# Patient Record
Sex: MALE
[De-identification: names, ages, dates, MRNs, and addresses within clinical notes are randomized per-mention and may not be internally consistent; named-entity substitution may affect disease eponyms.]

---

## 2020-12-29 ENCOUNTER — HOSPITAL ENCOUNTER (INPATIENT)
Dept: HOSPITAL 19 - COL.ER | Age: 40
LOS: 15 days | Discharge: HOME | DRG: 177 | End: 2021-01-13
Attending: STUDENT IN AN ORGANIZED HEALTH CARE EDUCATION/TRAINING PROGRAM | Admitting: STUDENT IN AN ORGANIZED HEALTH CARE EDUCATION/TRAINING PROGRAM
Payer: SELF-PAY

## 2020-12-29 VITALS — HEIGHT: 72.01 IN | WEIGHT: 230.16 LBS | BODY MASS INDEX: 31.17 KG/M2

## 2020-12-29 VITALS — TEMPERATURE: 98.8 F | DIASTOLIC BLOOD PRESSURE: 80 MMHG | HEART RATE: 107 BPM | SYSTOLIC BLOOD PRESSURE: 130 MMHG

## 2020-12-29 VITALS — HEART RATE: 112 BPM | DIASTOLIC BLOOD PRESSURE: 88 MMHG | TEMPERATURE: 100.1 F | SYSTOLIC BLOOD PRESSURE: 142 MMHG

## 2020-12-29 DIAGNOSIS — J12.82: ICD-10-CM

## 2020-12-29 DIAGNOSIS — R74.01: ICD-10-CM

## 2020-12-29 DIAGNOSIS — Z85.71: ICD-10-CM

## 2020-12-29 DIAGNOSIS — U07.1: Primary | ICD-10-CM

## 2020-12-29 DIAGNOSIS — J96.01: ICD-10-CM

## 2020-12-29 LAB
ALBUMIN SERPL-MCNC: 3.8 GM/DL (ref 3.5–5)
ALP SERPL-CCNC: 86 U/L (ref 50–136)
ALT SERPL-CCNC: 240 U/L (ref 4–49)
ANION GAP SERPL CALC-SCNC: 9 MMOL/L (ref 7–16)
AST SERPL-CCNC: 268 U/L (ref 15–37)
BASOPHILS # BLD: 0 10*3/UL (ref 0–0.2)
BASOPHILS NFR BLD AUTO: 0.2 % (ref 0–2)
BILIRUB SERPL-MCNC: 0.5 MG/DL (ref 0–1)
BUN SERPL-MCNC: 6 MG/DL (ref 9–20)
CALCIUM SERPL-MCNC: 8.5 MG/DL (ref 8.4–10.2)
CHLORIDE SERPL-SCNC: 103 MMOL/L (ref 98–107)
CO2 SERPL-SCNC: 25 MMOL/L (ref 22–30)
CREAT SERPL-SCNC: 0.92 UMOL/L (ref 0.66–1.25)
EOSINOPHIL # BLD: 0 10*3/UL (ref 0–0.7)
EOSINOPHIL NFR BLD: 0 % (ref 0–4)
ERYTHROCYTE [DISTWIDTH] IN BLOOD BY AUTOMATED COUNT: 13.7 % (ref 11.5–14.5)
GLUCOSE SERPL-MCNC: 136 MG/DL (ref 74–106)
GRANULOCYTES # BLD AUTO: 71.4 % (ref 42.2–75.2)
HCT VFR BLD AUTO: 45.1 % (ref 42–52)
HGB BLD-MCNC: 15.2 G/DL (ref 13.5–18)
LYMPHOCYTES # BLD: 1.3 10*3/UL (ref 1.2–3.4)
LYMPHOCYTES NFR BLD: 23.2 % (ref 20–51)
MCH RBC QN AUTO: 29 PG (ref 27–31)
MCHC RBC AUTO-ENTMCNC: 34 G/DL (ref 33–37)
MCV RBC AUTO: 85 FL (ref 80–100)
MONOCYTES # BLD: 0.3 10*3/UL (ref 0.1–0.6)
MONOCYTES NFR BLD AUTO: 4.7 % (ref 1.7–9.3)
NEUTROPHILS # BLD: 4.1 10*3/UL (ref 1.4–6.5)
PLATELET # BLD AUTO: 158 K/MM3 (ref 130–400)
PMV BLD AUTO: 10.5 FL (ref 7.4–10.4)
POTASSIUM SERPL-SCNC: 3.5 MMOL/L (ref 3.4–5)
PROT SERPL-MCNC: 6.6 GM/DL (ref 6.4–8.2)
RBC # BLD AUTO: 5.32 M/MM3 (ref 4.2–5.6)
SODIUM SERPL-SCNC: 138 MMOL/L (ref 137–145)
TROPONIN I SERPL-MCNC: < 0.012 NG/ML (ref 0–0.04)

## 2020-12-29 PROCEDURE — A9284 NON-ELECTRONIC SPIROMETER: HCPCS

## 2020-12-29 PROCEDURE — XW033E5 INTRODUCTION OF REMDESIVIR ANTI-INFECTIVE INTO PERIPHERAL VEIN, PERCUTANEOUS APPROACH, NEW TECHNOLOGY GROUP 5: ICD-10-PCS | Performed by: STUDENT IN AN ORGANIZED HEALTH CARE EDUCATION/TRAINING PROGRAM

## 2020-12-29 NOTE — NUR
Patient pleasant, A/Ox4. Patient denies any chest pain, SOB or dyspnea while
at rest. Patient states he gets SOB upon ambulating to the bathroom. SPO2 88%
on 3L via NC. Titrate oxygen to 4L. SPO2 91% on 4L via NC. NS fluid running at
100ml/hr via right forearm IV site. Right forearm IV site has no s/s of
complications. Call light within reach. Patient denies any needs at this time.

## 2020-12-29 NOTE — NUR
Patient to room from ER via wheelchair. Transfers self from wheel chair to
bed. Oxygen at 2L/NC. SpO2 unable to get higher than 88%, oxygen increased to
3L/NC. SpO2 at 92%. Denies pain. Oriented to room.

## 2020-12-30 VITALS — SYSTOLIC BLOOD PRESSURE: 162 MMHG | DIASTOLIC BLOOD PRESSURE: 98 MMHG | TEMPERATURE: 103.1 F | HEART RATE: 114 BPM

## 2020-12-30 VITALS — DIASTOLIC BLOOD PRESSURE: 88 MMHG | SYSTOLIC BLOOD PRESSURE: 140 MMHG | TEMPERATURE: 98.5 F | HEART RATE: 113 BPM

## 2020-12-30 VITALS — TEMPERATURE: 97.7 F | SYSTOLIC BLOOD PRESSURE: 136 MMHG | HEART RATE: 72 BPM | DIASTOLIC BLOOD PRESSURE: 64 MMHG

## 2020-12-30 VITALS — HEART RATE: 110 BPM | SYSTOLIC BLOOD PRESSURE: 162 MMHG | DIASTOLIC BLOOD PRESSURE: 86 MMHG | TEMPERATURE: 98.4 F

## 2020-12-30 VITALS — DIASTOLIC BLOOD PRESSURE: 64 MMHG | HEART RATE: 75 BPM | SYSTOLIC BLOOD PRESSURE: 120 MMHG | TEMPERATURE: 97.7 F

## 2020-12-30 VITALS — TEMPERATURE: 99.1 F | HEART RATE: 103 BPM | SYSTOLIC BLOOD PRESSURE: 150 MMHG | DIASTOLIC BLOOD PRESSURE: 96 MMHG

## 2020-12-30 VITALS — TEMPERATURE: 99.3 F

## 2020-12-30 NOTE — NUR
The patient is positive for COVID. SW contacted the patient's room phone to
discuss discharge plan. The patient lives in [place] with his wife, Shelly
(ph#[phone]), and three children. He reports independence with ADLs and
does not have any DME. The patient's PCP is Dr. Elian Cee and he receives his
medications from [facility]. He reports no difficulties obtaining his meds.
The patient confirms that he is self pay. The patient reports that he received
a Financial Assistance Application yesterday. The patient's DPOA-HC is in EMR
and it designates his wife. The patient plans to return home with his family
upon discharge. SW then contacted and reviewed the d/c plan with the patient's
wife, Shelly. Shelly reports no concerns with the patient returning back home
upon discharge. The patient is currently requiring 8 liters of oxygen. SW to
continue to follow.

## 2020-12-30 NOTE — NUR
Liza with respiratory infomred and will come in to see patient. Patient lying
in bed. SpO2 at 90% on 7L/NC.

## 2020-12-30 NOTE — NUR
Sitting up in bed watching TV. Says that he is feeling better. Temp 99.1
orally. Patient says that his breathing is feeling better as well. Provided
breakfast tray. Denies additional needs.

## 2020-12-30 NOTE — NUR
Patient requests to shower at this time. Provided items. Patient up to shower
at this time. Will call if help is needed or when he is done.

## 2020-12-30 NOTE — NUR
Patient just getting done with shower portion. Is drying off at this time.
Says that he is doing great. Breathing okay. Oxygen remains on at 7L/NC. Will
call when ready to be connected to tele.

## 2020-12-30 NOTE — NUR
Patient sitting on edge of bed. Says he has been up walking in room as he is
tired of sitting in the bed. Short of air from ambulating. SpO2 88% initially
on O2 at 7L/NC. After several minutes SpO2 up to 90%. Patient says that he is
feeling good at this time. Denies additional needs.

## 2020-12-30 NOTE — NUR
Sputum culture collected at 0300 am and sent to lab. SPO2 90% on 6L via NC at
0520 am. Patient denies SOB or dyspnea while at rest. No acute respiratory
distress noted throughout the shift. Call light within reach.

## 2020-12-30 NOTE — NUR
Patient sitting on edge of bed. Says he went to the bathroom without oxygen
on. Became very short of air. Has on SpO2 and level at 86-87%. Patient says
that it was as low as 80%. Oxygen increased to 7L/NC. Will contact respiratory
as SpO2 only increasing to 87%. Will add extension tubing so patient can keep
oxygen on when going to the bathroom. Patient also has temp 103, will
administer Motrin. Lung sounds diminished. Has occasional productive cough of
light green sputum. Will provide fresh ice water. Denies additional needs.

## 2020-12-30 NOTE — NUR
Patient sitting up in bed watching video on cell phone. Says that he feels
good at this time. Denies pain or difficulty breathing. Provided toothbrush
and toothpaste to the patient. Patient says that he does not want to shower at
this time and maybe he will this afternoon. Denies additional needs.

## 2020-12-31 VITALS — SYSTOLIC BLOOD PRESSURE: 138 MMHG | TEMPERATURE: 98.8 F | DIASTOLIC BLOOD PRESSURE: 90 MMHG | HEART RATE: 103 BPM

## 2020-12-31 VITALS — HEART RATE: 108 BPM | TEMPERATURE: 99.3 F | DIASTOLIC BLOOD PRESSURE: 74 MMHG | SYSTOLIC BLOOD PRESSURE: 128 MMHG

## 2020-12-31 VITALS — DIASTOLIC BLOOD PRESSURE: 60 MMHG | TEMPERATURE: 100.3 F | HEART RATE: 107 BPM | SYSTOLIC BLOOD PRESSURE: 130 MMHG

## 2020-12-31 VITALS — DIASTOLIC BLOOD PRESSURE: 82 MMHG | TEMPERATURE: 98.9 F | HEART RATE: 105 BPM | SYSTOLIC BLOOD PRESSURE: 118 MMHG

## 2020-12-31 VITALS — DIASTOLIC BLOOD PRESSURE: 70 MMHG | SYSTOLIC BLOOD PRESSURE: 112 MMHG | HEART RATE: 102 BPM | TEMPERATURE: 98.2 F

## 2020-12-31 VITALS — TEMPERATURE: 102.8 F

## 2020-12-31 VITALS — SYSTOLIC BLOOD PRESSURE: 122 MMHG | DIASTOLIC BLOOD PRESSURE: 78 MMHG | TEMPERATURE: 98.2 F | HEART RATE: 72 BPM

## 2020-12-31 VITALS — TEMPERATURE: 98.8 F

## 2020-12-31 LAB
ALBUMIN SERPL-MCNC: 3.3 GM/DL (ref 3.5–5)
ALP SERPL-CCNC: 77 U/L (ref 50–136)
ALT SERPL-CCNC: 299 U/L (ref 4–49)
ANION GAP SERPL CALC-SCNC: 9 MMOL/L (ref 7–16)
AST SERPL-CCNC: 227 U/L (ref 15–37)
BASOPHILS # BLD: 0 10*3/UL (ref 0–0.2)
BASOPHILS NFR BLD AUTO: 0.1 % (ref 0–2)
BILIRUB DIRECT SERPL-MCNC: 0.1 MG/DL (ref 0–0.4)
BILIRUB INDIRECT SERPL-MCNC: 0.4 MG/DL (ref 0–1.1)
BILIRUB SERPL-MCNC: 0.5 MG/DL (ref 0–1)
BUN SERPL-MCNC: 12 MG/DL (ref 9–20)
CALCIUM SERPL-MCNC: 8.4 MG/DL (ref 8.4–10.2)
CHLORIDE SERPL-SCNC: 103 MMOL/L (ref 98–107)
CO2 SERPL-SCNC: 24 MMOL/L (ref 22–30)
CREAT SERPL-SCNC: 0.8 UMOL/L (ref 0.66–1.25)
EOSINOPHIL # BLD: 0 10*3/UL (ref 0–0.7)
EOSINOPHIL NFR BLD: 0 % (ref 0–4)
ERYTHROCYTE [DISTWIDTH] IN BLOOD BY AUTOMATED COUNT: 14.1 % (ref 11.5–14.5)
GLUCOSE SERPL-MCNC: 107 MG/DL (ref 74–106)
GRANULOCYTES # BLD AUTO: 76.5 % (ref 42.2–75.2)
HCT VFR BLD AUTO: 42.4 % (ref 42–52)
HGB BLD-MCNC: 14.1 G/DL (ref 13.5–18)
LYMPHOCYTES # BLD: 2 10*3/UL (ref 1.2–3.4)
LYMPHOCYTES NFR BLD: 17.7 % (ref 20–51)
MCH RBC QN AUTO: 29 PG (ref 27–31)
MCHC RBC AUTO-ENTMCNC: 33 G/DL (ref 33–37)
MCV RBC AUTO: 86 FL (ref 80–100)
MONOCYTES # BLD: 0.6 10*3/UL (ref 0.1–0.6)
MONOCYTES NFR BLD AUTO: 5 % (ref 1.7–9.3)
NEUTROPHILS # BLD: 8.8 10*3/UL (ref 1.4–6.5)
PLATELET # BLD AUTO: 194 K/MM3 (ref 130–400)
PMV BLD AUTO: 10.4 FL (ref 7.4–10.4)
POTASSIUM SERPL-SCNC: 3.6 MMOL/L (ref 3.4–5)
PROT SERPL-MCNC: 6.1 GM/DL (ref 6.4–8.2)
RBC # BLD AUTO: 4.92 M/MM3 (ref 4.2–5.6)
SODIUM SERPL-SCNC: 137 MMOL/L (ref 137–145)

## 2020-12-31 NOTE — NUR
followed up with Shelia, Financial Counselor who advised she gave
Financial Assistance Application to patient's RN yesterday to bring into his
room as he is COVID positive. LUIS spoke with Selma RN who states she will
follow up with patient to see if he completed the application. LUIS advised
Selma that if completed, pages would need to be placed in ziploc bags and
copies would need to be made. LUIS contacted Yue at [facility] to give heads up that patient may need home oxygen in case patient
were to discharge over the weekend. LUIS advised this referral would be
pending marylou and that patient has FAA paperwork. Yue advised to call on
call if patient were to discharge over the weekend.

## 2020-12-31 NOTE — NUR
Patient had temp tmax 102.8. Given motrin. Otherwise uneventful night. Resting
in bed this AM. Call light in reach.

## 2020-12-31 NOTE — NUR
Resting in bed. Assessment complete. Lung bases diminished bilaterally. Upper
lobes clear. Heart sounds normal. Bowels active x4. Pulses present
throughout. No edema noted. INT right forarm flushed without complications.
Denies pain. Denies needs. Call light in reach.

## 2020-12-31 NOTE — NUR
Assessment complete. Pt sitting up in bed, A&O x 4, reports feeling about the
same. O2 at 6 L/min via HFNC. Pt denies pain at this time. Saline lock IV to
right forearm without s/s of complications. No further needs reported. call
light in reach.

## 2020-12-31 NOTE — NUR
Pt sitting up in bed watching videos, denies needs. Pt has been afebrile this
shift, remains at 6 L/min O2 via HFNC. Uneventful shift. Call light in reach.

## 2021-01-01 VITALS — SYSTOLIC BLOOD PRESSURE: 127 MMHG | HEART RATE: 115 BPM | DIASTOLIC BLOOD PRESSURE: 77 MMHG | TEMPERATURE: 98.3 F

## 2021-01-01 VITALS — TEMPERATURE: 98.3 F | SYSTOLIC BLOOD PRESSURE: 134 MMHG | DIASTOLIC BLOOD PRESSURE: 81 MMHG | HEART RATE: 100 BPM

## 2021-01-01 VITALS — TEMPERATURE: 98.4 F | DIASTOLIC BLOOD PRESSURE: 63 MMHG | HEART RATE: 112 BPM | SYSTOLIC BLOOD PRESSURE: 134 MMHG

## 2021-01-01 VITALS — SYSTOLIC BLOOD PRESSURE: 116 MMHG | HEART RATE: 98 BPM | DIASTOLIC BLOOD PRESSURE: 61 MMHG | TEMPERATURE: 98 F

## 2021-01-01 VITALS — SYSTOLIC BLOOD PRESSURE: 144 MMHG | HEART RATE: 99 BPM | TEMPERATURE: 98.1 F | DIASTOLIC BLOOD PRESSURE: 86 MMHG

## 2021-01-01 VITALS — TEMPERATURE: 98 F | SYSTOLIC BLOOD PRESSURE: 132 MMHG | DIASTOLIC BLOOD PRESSURE: 84 MMHG | HEART RATE: 102 BPM

## 2021-01-01 VITALS — DIASTOLIC BLOOD PRESSURE: 64 MMHG | SYSTOLIC BLOOD PRESSURE: 132 MMHG | HEART RATE: 93 BPM | TEMPERATURE: 98.5 F

## 2021-01-01 LAB
ANION GAP SERPL CALC-SCNC: 8 MMOL/L (ref 7–16)
ANISOCYTOSIS BLD QL: (no result)
BASE EXCESS BLDA CALC-SCNC: -2.9 MMOL/L (ref -2–2)
BASE EXCESS BLDA CALC-SCNC: 0.5 MMOL/L (ref -2–2)
BUN SERPL-MCNC: 15 MG/DL (ref 9–20)
CALCIUM SERPL-MCNC: 8.7 MG/DL (ref 8.4–10.2)
CHLORIDE SERPL-SCNC: 106 MMOL/L (ref 98–107)
CO2 BLDA-SCNC: 20.4 MMOL/L
CO2 BLDA-SCNC: 24.8 MMOL/L
CO2 SERPL-SCNC: 24 MMOL/L (ref 22–30)
CREAT SERPL-SCNC: 0.71 UMOL/L (ref 0.66–1.25)
ERYTHROCYTE [DISTWIDTH] IN BLOOD BY AUTOMATED COUNT: 13.9 % (ref 11.5–14.5)
GLUCOSE SERPL-MCNC: 112 MG/DL (ref 74–106)
HCO3 BLDA-SCNC: 19.5 MEQ/L (ref 22–26)
HCO3 BLDA-SCNC: 23.7 MEQ/L (ref 22–26)
HCT VFR BLD AUTO: 44.5 % (ref 42–52)
HGB BLD-MCNC: 15.2 G/DL (ref 13.5–18)
INHALED O2 CONCENTRATION: 80 %
LYMPHOCYTES NFR BLD MANUAL: 12 % (ref 20–51)
MCH RBC QN AUTO: 29 PG (ref 27–31)
MCHC RBC AUTO-ENTMCNC: 34 G/DL (ref 33–37)
MCV RBC AUTO: 86 FL (ref 80–100)
METAMYELOCYTES NFR BLD MANUAL: 2 % (ref 0–0)
MONOCYTES NFR BLD: 3 % (ref 1.7–9.3)
NEUTS SEG NFR BLD MANUAL: 83 % (ref 42–75.2)
PCO2 BLDA: 28.5 MMHG (ref 35–45)
PCO2 BLDA: 34.3 MMHG (ref 35–45)
PLATELET # BLD AUTO: 223 K/MM3 (ref 130–400)
PLATELET BLD QL SMEAR: NORMAL
PMV BLD AUTO: 9.9 FL (ref 7.4–10.4)
PO2 BLDA: 48.5 MMHG (ref 80–100)
PO2 BLDA: 81.3 MMHG (ref 80–100)
POLYCHROMASIA BLD QL SMEAR: (no result)
POTASSIUM SERPL-SCNC: 3.9 MMOL/L (ref 3.4–5)
RBC # BLD AUTO: 5.18 M/MM3 (ref 4.2–5.6)
SAO2 % BLDA: 86.3 % (ref 92–100)
SAO2 % BLDA: 96.2 % (ref 92–100)
SODIUM SERPL-SCNC: 138 MMOL/L (ref 137–145)

## 2021-01-01 NOTE — NUR
Report received from LETTY Sanchez. Assisted Laura SAENZ and Seda RT to bring
pt down to CT via w/c with bipap. Pt tolerated well but did get very SOB when
transferring from wc to CT bed, RRR increased to 50/min at one point.  Pt able
to transfer standy and get back to bed well after completing CT. Per RT able
to titrate down on bipap after settling back into bed and is doing well,k will
continue to monitor.

## 2021-01-01 NOTE — NUR
Patient oxygen needs increased. Patient now on bipap at 80% fio2. 97% on
saturations. Rosie FELDER notifed.

## 2021-01-01 NOTE — NUR
Pt waiting to get off bipap so he could go to bathroom and eat some breakfast.
called RT and they walked me through getting airvo going for patient. Airvo at
60L/75% and saturations 93%. Up to bathroom, had BM. Resting in bed, still
tachypneac and short of breath but 02 saturations maintaining on Airvo.
Dasha Tay RN on Surgical updated per pt request. Denies pain, will continue
to monitor.

## 2021-01-01 NOTE — NUR
Pt titrated to 60L 85% to maintain 02 saturations. Able to do IS often,
monitoring own saturations with home SPO2 monitor. Resting well, states he
feels much better this afternoon. Will continue to monitor and give bedside
shift report to nightshift nurse who will resume care.

## 2021-01-01 NOTE — NUR
Respiratory and CNA reports patient desaturation to 82% on hospital monitor
and 86% on personal monitor. Patient at 10L currently. Previously on 6 liters.
Spoke with Rosie FELDER. Obtain ABG. Patient resting in bed. Denies shortness
of breath. Respiratory notified.

## 2021-01-01 NOTE — NUR
Resting in bed. Assessment complete. Right lower lobe coarse, right upper lobe
clear, left lower lobe diminished, left upper lobe clear. Patient has
shortness of breath with ambulation. On airvo currently. Agrees to return to
bip for HS. Heart sounds normal. Bowels active x4. Pulses present
throughout. No edema noted. INT left hand flushed without complications.
Denies pain. Denies needs. Call light in reach.

## 2021-01-01 NOTE — NUR
Patient had increased oxygen needs throughout night. Attempted Airvo-needed
bipap. Patient currently on bipap at 80% FiO2. Saturations 97%. Given x1 dose
of ativan for anxiety and x1 dose of motrin for headache. Otherwise uneventful
night. Resting in bed this AM. Will continue to monitor.

## 2021-01-01 NOTE — NUR
Resting in bed. On bipap at 50% fiO2. Saturation 92%. Denies other needs at
this time. Call light in reach. Will monitor.

## 2021-01-02 VITALS — HEART RATE: 102 BPM | DIASTOLIC BLOOD PRESSURE: 69 MMHG | SYSTOLIC BLOOD PRESSURE: 121 MMHG | TEMPERATURE: 98.3 F

## 2021-01-02 VITALS — DIASTOLIC BLOOD PRESSURE: 74 MMHG | HEART RATE: 100 BPM | SYSTOLIC BLOOD PRESSURE: 130 MMHG | TEMPERATURE: 98.2 F

## 2021-01-02 VITALS — DIASTOLIC BLOOD PRESSURE: 83 MMHG | HEART RATE: 90 BPM | SYSTOLIC BLOOD PRESSURE: 123 MMHG | TEMPERATURE: 98 F

## 2021-01-02 VITALS — HEART RATE: 108 BPM | TEMPERATURE: 98.4 F | DIASTOLIC BLOOD PRESSURE: 80 MMHG | SYSTOLIC BLOOD PRESSURE: 122 MMHG

## 2021-01-02 VITALS — HEART RATE: 87 BPM | DIASTOLIC BLOOD PRESSURE: 54 MMHG | TEMPERATURE: 98.1 F | SYSTOLIC BLOOD PRESSURE: 98 MMHG

## 2021-01-02 VITALS — DIASTOLIC BLOOD PRESSURE: 76 MMHG | HEART RATE: 102 BPM | TEMPERATURE: 99 F | SYSTOLIC BLOOD PRESSURE: 118 MMHG

## 2021-01-02 LAB
ALBUMIN SERPL-MCNC: 3.6 GM/DL (ref 3.5–5)
ALP SERPL-CCNC: 85 U/L (ref 50–136)
ALT SERPL-CCNC: 254 U/L (ref 4–49)
ANION GAP SERPL CALC-SCNC: 7 MMOL/L (ref 7–16)
AST SERPL-CCNC: 108 U/L (ref 15–37)
BILIRUB SERPL-MCNC: 0.7 MG/DL (ref 0–1)
BUN SERPL-MCNC: 16 MG/DL (ref 9–20)
CALCIUM SERPL-MCNC: 8.8 MG/DL (ref 8.4–10.2)
CHLORIDE SERPL-SCNC: 103 MMOL/L (ref 98–107)
CO2 SERPL-SCNC: 28 MMOL/L (ref 22–30)
CREAT SERPL-SCNC: 0.74 UMOL/L (ref 0.66–1.25)
ERYTHROCYTE [DISTWIDTH] IN BLOOD BY AUTOMATED COUNT: 13.7 % (ref 11.5–14.5)
GLUCOSE SERPL-MCNC: 104 MG/DL (ref 74–106)
HCT VFR BLD AUTO: 46.5 % (ref 42–52)
HGB BLD-MCNC: 15.3 G/DL (ref 13.5–18)
LYMPHOCYTES NFR BLD MANUAL: 20 % (ref 20–51)
MCH RBC QN AUTO: 29 PG (ref 27–31)
MCHC RBC AUTO-ENTMCNC: 33 G/DL (ref 33–37)
MCV RBC AUTO: 87 FL (ref 80–100)
MONOCYTES NFR BLD: 2 % (ref 1.7–9.3)
NEUTS SEG NFR BLD MANUAL: 78 % (ref 42–75.2)
PLATELET # BLD AUTO: 271 K/MM3 (ref 130–400)
PLATELET BLD QL SMEAR: NORMAL
PMV BLD AUTO: 9.8 FL (ref 7.4–10.4)
POTASSIUM SERPL-SCNC: 3.9 MMOL/L (ref 3.4–5)
PROT SERPL-MCNC: 6.4 GM/DL (ref 6.4–8.2)
RBC # BLD AUTO: 5.37 M/MM3 (ref 4.2–5.6)
SODIUM SERPL-SCNC: 138 MMOL/L (ref 137–145)

## 2021-01-02 NOTE — NUR
Pt called me to his room regarding a request f or a script for monoclonal
antibodies from Dr. weller. Called janee and will update pt with his message
that this is not available but that since his liver function has improved
greatly we will be able to most likely start remdesivir today.

## 2021-01-02 NOTE — NUR
Report received from LETTY Sanchez. Pt in bed gettting labs, denies needs,
will continue to monitor.

## 2021-01-02 NOTE — NUR
Assessment charted. Pt c/o headache 5/10, PRN motrin provided. Breathing today
is better but lungs are very diminished and saturations were low at 87% on
arrival, able to get up to 92% after eating and sitting up and moving.
Discussed using IS often today, encouraging coughing.  INT to LH. Will
continue to monitor.

## 2021-01-02 NOTE — NUR
Patient had uneventful night. Remained on bipap during night. Resting in bed
this AM. Call light in reach.

## 2021-01-02 NOTE — NUR
Pt has done better this afternoon, started on remdesevir and happy with this
plan. Resting in bed, maintaining oxygen saturations. Denies pain, will give
bedside shift report to nightshift nusre who will resume care.

## 2021-01-03 VITALS — OXYGEN SATURATION: 94 %

## 2021-01-03 VITALS — OXYGEN SATURATION: 92 %

## 2021-01-03 VITALS — OXYGEN SATURATION: 97 %

## 2021-01-03 VITALS — OXYGEN SATURATION: 95 %

## 2021-01-03 VITALS — OXYGEN SATURATION: 91 %

## 2021-01-03 VITALS — OXYGEN SATURATION: 96 %

## 2021-01-03 VITALS — OXYGEN SATURATION: 98 %

## 2021-01-03 VITALS — OXYGEN SATURATION: 93 %

## 2021-01-03 VITALS — OXYGEN SATURATION: 99 %

## 2021-01-03 VITALS
SYSTOLIC BLOOD PRESSURE: 113 MMHG | DIASTOLIC BLOOD PRESSURE: 75 MMHG | TEMPERATURE: 97.8 F | HEART RATE: 80 BPM | OXYGEN SATURATION: 95 %

## 2021-01-03 VITALS
SYSTOLIC BLOOD PRESSURE: 119 MMHG | DIASTOLIC BLOOD PRESSURE: 83 MMHG | TEMPERATURE: 98.8 F | OXYGEN SATURATION: 100 % | HEART RATE: 86 BPM

## 2021-01-03 VITALS — OXYGEN SATURATION: 86 %

## 2021-01-03 VITALS — HEART RATE: 94 BPM | SYSTOLIC BLOOD PRESSURE: 127 MMHG | TEMPERATURE: 98.4 F | DIASTOLIC BLOOD PRESSURE: 69 MMHG

## 2021-01-03 VITALS — TEMPERATURE: 100.3 F | SYSTOLIC BLOOD PRESSURE: 109 MMHG | DIASTOLIC BLOOD PRESSURE: 76 MMHG | HEART RATE: 124 BPM

## 2021-01-03 VITALS
SYSTOLIC BLOOD PRESSURE: 108 MMHG | DIASTOLIC BLOOD PRESSURE: 76 MMHG | HEART RATE: 101 BPM | OXYGEN SATURATION: 96 % | TEMPERATURE: 99 F

## 2021-01-03 VITALS
TEMPERATURE: 99.4 F | SYSTOLIC BLOOD PRESSURE: 122 MMHG | OXYGEN SATURATION: 93 % | HEART RATE: 119 BPM | DIASTOLIC BLOOD PRESSURE: 82 MMHG

## 2021-01-03 VITALS — OXYGEN SATURATION: 100 %

## 2021-01-03 VITALS — OXYGEN SATURATION: 90 %

## 2021-01-03 VITALS — OXYGEN SATURATION: 85 %

## 2021-01-03 VITALS — OXYGEN SATURATION: 89 %

## 2021-01-03 LAB
ALBUMIN SERPL-MCNC: 3.5 GM/DL (ref 3.5–5)
ALP SERPL-CCNC: 85 U/L (ref 50–136)
ALT SERPL-CCNC: 233 U/L (ref 4–49)
ANION GAP SERPL CALC-SCNC: 8 MMOL/L (ref 7–16)
AST SERPL-CCNC: 129 U/L (ref 15–37)
BASE EXCESS BLDA CALC-SCNC: -0.8 MMOL/L (ref -2–2)
BILIRUB SERPL-MCNC: 0.7 MG/DL (ref 0–1)
BUN SERPL-MCNC: 18 MG/DL (ref 9–20)
CALCIUM SERPL-MCNC: 8.7 MG/DL (ref 8.4–10.2)
CHLORIDE SERPL-SCNC: 102 MMOL/L (ref 98–107)
CO2 BLDA-SCNC: 22.2 MMOL/L
CO2 SERPL-SCNC: 26 MMOL/L (ref 22–30)
CREAT SERPL-SCNC: 0.77 UMOL/L (ref 0.66–1.25)
ERYTHROCYTE [DISTWIDTH] IN BLOOD BY AUTOMATED COUNT: 13.5 % (ref 11.5–14.5)
GLUCOSE SERPL-MCNC: 95 MG/DL (ref 74–106)
HCO3 BLDA-SCNC: 21.3 MEQ/L (ref 22–26)
HCT VFR BLD AUTO: 46.6 % (ref 42–52)
HGB BLD-MCNC: 15.8 G/DL (ref 13.5–18)
INHALED O2 CONCENTRATION: 90 %
LYMPHOCYTES NFR BLD MANUAL: 20 % (ref 20–51)
MCH RBC QN AUTO: 29 PG (ref 27–31)
MCHC RBC AUTO-ENTMCNC: 34 G/DL (ref 33–37)
MCV RBC AUTO: 85 FL (ref 80–100)
METAMYELOCYTES NFR BLD MANUAL: 3 % (ref 0–0)
MONOCYTES NFR BLD: 3 % (ref 1.7–9.3)
NEUTS BAND NFR BLD: 1 % (ref 0–10)
NEUTS SEG NFR BLD MANUAL: 73 % (ref 42–75.2)
PCO2 BLDA: 29.3 MMHG (ref 35–45)
PLATELET # BLD AUTO: 260 K/MM3 (ref 130–400)
PLATELET BLD QL SMEAR: NORMAL
PMV BLD AUTO: 10 FL (ref 7.4–10.4)
PO2 BLDA: 61.5 MMHG (ref 80–100)
POTASSIUM SERPL-SCNC: 3.9 MMOL/L (ref 3.4–5)
PROT SERPL-MCNC: 6.4 GM/DL (ref 6.4–8.2)
RBC # BLD AUTO: 5.46 M/MM3 (ref 4.2–5.6)
SAO2 % BLDA: 93.1 % (ref 92–100)
SODIUM SERPL-SCNC: 135 MMOL/L (ref 137–145)

## 2021-01-03 NOTE — NUR
Called Dr. Jerez as pt having panic attack, called RT and ICU charge, pt able
to get on bipap and get comfortable enough, NOW MS given to help with
breathing, pt doing okay, notified Intensivist of consult. RT in room
monitoring pt breathing, maxed out on bipap

## 2021-01-03 NOTE — NUR
Report received from travel Rhoda RN. Pt in room getting help from RT to
titrte AIRVO so he can maintain 02 saturations. Per night shift has been
alarming often this am and difficult to maintain at 90%.

## 2021-01-03 NOTE — NUR
Pt tolerating well on BIPAP at this time but will need to go to ICU per Dr. Jerez. Called Dasha RN who is his niece per his request and updated her, she
called wife Martha and updated her. He called on call light and upon entry in
the room he wanted me to talk to his , Jimenez. Updated him and pt is now
prepped for transfer to ICU. Called RT to transfer patient and ICU charge
nurse to notify, waiting on RT to assist with BIPAP during transfer.

## 2021-01-03 NOTE — NUR
Transferred pt down to ICU room 3 via bed with RT and clean person to open
doors. Pt tolerated well, able to scoot self over to new bed. Report given to
LETTY Downey who will resume care.

## 2021-01-03 NOTE — NUR
Assessment charted. Called COLLEEN Rivers with Hospitalist. Pt maxed on AIRVO
and still having trouble maintaining saturations.  Orders received and
implemented for labs and ABG. Motrin given for temp of 100.3 this am.  Pt
feeling a bit anxious about current oxygen needs. Does not have pain anywhere
but is more tachycardic today and breathign is more tachypneac and shallow.
Discussed going to bipap as soon as finished with eating.  Pt agreeable to
plan, will continue to monitor.

## 2021-01-03 NOTE — NUR
Patient transferred down to ICU 3 via bed with nurse and RT at side. Transfers
self to ICU bed. BiPap on, tolerating well

## 2021-01-04 VITALS — OXYGEN SATURATION: 89 %

## 2021-01-04 VITALS — OXYGEN SATURATION: 90 %

## 2021-01-04 VITALS — OXYGEN SATURATION: 95 %

## 2021-01-04 VITALS — OXYGEN SATURATION: 97 %

## 2021-01-04 VITALS — OXYGEN SATURATION: 94 %

## 2021-01-04 VITALS — OXYGEN SATURATION: 93 %

## 2021-01-04 VITALS — OXYGEN SATURATION: 98 %

## 2021-01-04 VITALS — OXYGEN SATURATION: 99 %

## 2021-01-04 VITALS — HEART RATE: 121 BPM | DIASTOLIC BLOOD PRESSURE: 88 MMHG | SYSTOLIC BLOOD PRESSURE: 121 MMHG | OXYGEN SATURATION: 94 %

## 2021-01-04 VITALS — OXYGEN SATURATION: 92 %

## 2021-01-04 VITALS — OXYGEN SATURATION: 91 %

## 2021-01-04 VITALS — OXYGEN SATURATION: 85 %

## 2021-01-04 VITALS — OXYGEN SATURATION: 96 %

## 2021-01-04 VITALS — DIASTOLIC BLOOD PRESSURE: 75 MMHG | SYSTOLIC BLOOD PRESSURE: 126 MMHG | HEART RATE: 97 BPM

## 2021-01-04 VITALS — OXYGEN SATURATION: 87 %

## 2021-01-04 VITALS — OXYGEN SATURATION: 86 %

## 2021-01-04 VITALS
OXYGEN SATURATION: 95 % | SYSTOLIC BLOOD PRESSURE: 118 MMHG | TEMPERATURE: 98.3 F | HEART RATE: 109 BPM | DIASTOLIC BLOOD PRESSURE: 78 MMHG

## 2021-01-04 VITALS — OXYGEN SATURATION: 88 %

## 2021-01-04 VITALS — HEART RATE: 106 BPM | TEMPERATURE: 97.7 F | SYSTOLIC BLOOD PRESSURE: 134 MMHG | DIASTOLIC BLOOD PRESSURE: 89 MMHG

## 2021-01-04 VITALS
TEMPERATURE: 98.4 F | HEART RATE: 82 BPM | OXYGEN SATURATION: 98 % | SYSTOLIC BLOOD PRESSURE: 115 MMHG | DIASTOLIC BLOOD PRESSURE: 76 MMHG

## 2021-01-04 VITALS — OXYGEN SATURATION: 100 %

## 2021-01-04 VITALS — OXYGEN SATURATION: 81 %

## 2021-01-04 LAB
ERYTHROCYTE [DISTWIDTH] IN BLOOD BY AUTOMATED COUNT: 13.6 % (ref 11.5–14.5)
HCT VFR BLD AUTO: 47.2 % (ref 42–52)
HGB BLD-MCNC: 15.9 G/DL (ref 13.5–18)
LYMPHOCYTES NFR BLD MANUAL: 16 % (ref 20–51)
MCH RBC QN AUTO: 29 PG (ref 27–31)
MCHC RBC AUTO-ENTMCNC: 34 G/DL (ref 33–37)
MCV RBC AUTO: 86 FL (ref 80–100)
MONOCYTES NFR BLD: 5 % (ref 1.7–9.3)
NEUTS BAND NFR BLD: 1 % (ref 0–10)
NEUTS SEG NFR BLD MANUAL: 78 % (ref 42–75.2)
PLATELET # BLD AUTO: 265 K/MM3 (ref 130–400)
PLATELET BLD QL SMEAR: NORMAL
PMV BLD AUTO: 9.8 FL (ref 7.4–10.4)
RBC # BLD AUTO: 5.5 M/MM3 (ref 4.2–5.6)

## 2021-01-05 VITALS — OXYGEN SATURATION: 94 %

## 2021-01-05 VITALS — OXYGEN SATURATION: 89 %

## 2021-01-05 VITALS — OXYGEN SATURATION: 95 %

## 2021-01-05 VITALS — OXYGEN SATURATION: 98 %

## 2021-01-05 VITALS — OXYGEN SATURATION: 93 %

## 2021-01-05 VITALS — OXYGEN SATURATION: 96 %

## 2021-01-05 VITALS — OXYGEN SATURATION: 97 %

## 2021-01-05 VITALS — OXYGEN SATURATION: 92 %

## 2021-01-05 VITALS — OXYGEN SATURATION: 88 %

## 2021-01-05 VITALS — OXYGEN SATURATION: 91 %

## 2021-01-05 VITALS — OXYGEN SATURATION: 90 %

## 2021-01-05 VITALS
TEMPERATURE: 98.7 F | HEART RATE: 104 BPM | OXYGEN SATURATION: 95 % | DIASTOLIC BLOOD PRESSURE: 64 MMHG | SYSTOLIC BLOOD PRESSURE: 110 MMHG

## 2021-01-05 VITALS — OXYGEN SATURATION: 87 %

## 2021-01-05 VITALS — OXYGEN SATURATION: 86 %

## 2021-01-05 VITALS
HEART RATE: 107 BPM | DIASTOLIC BLOOD PRESSURE: 74 MMHG | TEMPERATURE: 98 F | OXYGEN SATURATION: 94 % | SYSTOLIC BLOOD PRESSURE: 116 MMHG

## 2021-01-05 VITALS — DIASTOLIC BLOOD PRESSURE: 98 MMHG | TEMPERATURE: 98.2 F | SYSTOLIC BLOOD PRESSURE: 138 MMHG | HEART RATE: 114 BPM

## 2021-01-05 VITALS
HEART RATE: 74 BPM | OXYGEN SATURATION: 97 % | SYSTOLIC BLOOD PRESSURE: 119 MMHG | DIASTOLIC BLOOD PRESSURE: 84 MMHG | TEMPERATURE: 97.4 F

## 2021-01-05 VITALS — OXYGEN SATURATION: 84 %

## 2021-01-05 VITALS
DIASTOLIC BLOOD PRESSURE: 87 MMHG | SYSTOLIC BLOOD PRESSURE: 142 MMHG | TEMPERATURE: 98.7 F | OXYGEN SATURATION: 93 % | HEART RATE: 108 BPM

## 2021-01-05 VITALS — OXYGEN SATURATION: 99 %

## 2021-01-05 VITALS — OXYGEN SATURATION: 85 %

## 2021-01-05 VITALS
OXYGEN SATURATION: 98 % | SYSTOLIC BLOOD PRESSURE: 131 MMHG | HEART RATE: 83 BPM | TEMPERATURE: 98.8 F | DIASTOLIC BLOOD PRESSURE: 86 MMHG

## 2021-01-05 LAB
ALBUMIN SERPL-MCNC: 3.4 GM/DL (ref 3.5–5)
ALP SERPL-CCNC: 83 U/L (ref 50–136)
ALT SERPL-CCNC: 173 U/L (ref 4–49)
ANION GAP SERPL CALC-SCNC: 8 MMOL/L (ref 7–16)
AST SERPL-CCNC: 65 U/L (ref 15–37)
BASE EXCESS BLDA CALC-SCNC: -0.1 MMOL/L (ref -2–2)
BILIRUB SERPL-MCNC: 0.5 MG/DL (ref 0–1)
BUN SERPL-MCNC: 24 MG/DL (ref 9–20)
CALCIUM SERPL-MCNC: 8.8 MG/DL (ref 8.4–10.2)
CHLORIDE SERPL-SCNC: 104 MMOL/L (ref 98–107)
CO2 BLDA-SCNC: 24.7 MMOL/L
CO2 SERPL-SCNC: 25 MMOL/L (ref 22–30)
CREAT SERPL-SCNC: 0.73 UMOL/L (ref 0.66–1.25)
ERYTHROCYTE [DISTWIDTH] IN BLOOD BY AUTOMATED COUNT: 13.2 % (ref 11.5–14.5)
GLUCOSE SERPL-MCNC: 103 MG/DL (ref 74–106)
HCO3 BLDA-SCNC: 23.6 MEQ/L (ref 22–26)
HCT VFR BLD AUTO: 45 % (ref 42–52)
HGB BLD-MCNC: 15.4 G/DL (ref 13.5–18)
INHALED O2 CONCENTRATION: 50 %
LYMPHOCYTES NFR BLD MANUAL: 10 % (ref 20–51)
MAGNESIUM SERPL-MCNC: 2.5 MG/DL (ref 1.6–2.3)
MCH RBC QN AUTO: 29 PG (ref 27–31)
MCHC RBC AUTO-ENTMCNC: 34 G/DL (ref 33–37)
MCV RBC AUTO: 84 FL (ref 80–100)
MONOCYTES NFR BLD: 9 % (ref 1.7–9.3)
NEUTS BAND NFR BLD: 1 % (ref 0–10)
NEUTS SEG NFR BLD MANUAL: 80 % (ref 42–75.2)
PCO2 BLDA: 35.9 MMHG (ref 35–45)
PLATELET # BLD AUTO: 315 K/MM3 (ref 130–400)
PLATELET BLD QL SMEAR: NORMAL
PMV BLD AUTO: 10 FL (ref 7.4–10.4)
PO2 BLDA: 63.3 MMHG (ref 80–100)
POTASSIUM SERPL-SCNC: 4.1 MMOL/L (ref 3.4–5)
PROT SERPL-MCNC: 6.5 GM/DL (ref 6.4–8.2)
RBC # BLD AUTO: 5.37 M/MM3 (ref 4.2–5.6)
SAO2 % BLDA: 93.2 % (ref 92–100)
SODIUM SERPL-SCNC: 137 MMOL/L (ref 137–145)

## 2021-01-05 NOTE — NUR
contacted patient's wife Shelly to check in and introduce self.
Shelly advised that she received an update from Dr. Feng, but only understood
about 70% as English is her second language. Shelly states she has also been in
consistent communication with patient and nurses. Shelly advised she is now
COVID positive but her symptoms have been mild. SW will continue to follow.

## 2021-01-06 VITALS — OXYGEN SATURATION: 91 %

## 2021-01-06 VITALS — OXYGEN SATURATION: 96 %

## 2021-01-06 VITALS — OXYGEN SATURATION: 93 %

## 2021-01-06 VITALS — OXYGEN SATURATION: 95 %

## 2021-01-06 VITALS — OXYGEN SATURATION: 90 %

## 2021-01-06 VITALS — HEART RATE: 104 BPM | DIASTOLIC BLOOD PRESSURE: 60 MMHG | TEMPERATURE: 99 F | SYSTOLIC BLOOD PRESSURE: 109 MMHG

## 2021-01-06 VITALS — OXYGEN SATURATION: 94 %

## 2021-01-06 VITALS — OXYGEN SATURATION: 92 %

## 2021-01-06 VITALS — HEART RATE: 92 BPM | DIASTOLIC BLOOD PRESSURE: 77 MMHG | OXYGEN SATURATION: 95 % | SYSTOLIC BLOOD PRESSURE: 114 MMHG

## 2021-01-06 VITALS
TEMPERATURE: 97.6 F | HEART RATE: 81 BPM | SYSTOLIC BLOOD PRESSURE: 119 MMHG | DIASTOLIC BLOOD PRESSURE: 79 MMHG | OXYGEN SATURATION: 96 %

## 2021-01-06 VITALS — OXYGEN SATURATION: 88 %

## 2021-01-06 VITALS — OXYGEN SATURATION: 97 %

## 2021-01-06 VITALS — OXYGEN SATURATION: 99 %

## 2021-01-06 VITALS — OXYGEN SATURATION: 98 %

## 2021-01-06 VITALS — OXYGEN SATURATION: 87 %

## 2021-01-06 VITALS — DIASTOLIC BLOOD PRESSURE: 92 MMHG | SYSTOLIC BLOOD PRESSURE: 122 MMHG | HEART RATE: 98 BPM | TEMPERATURE: 97.8 F

## 2021-01-06 VITALS
SYSTOLIC BLOOD PRESSURE: 124 MMHG | TEMPERATURE: 98.2 F | HEART RATE: 108 BPM | DIASTOLIC BLOOD PRESSURE: 91 MMHG | OXYGEN SATURATION: 90 %

## 2021-01-06 VITALS — OXYGEN SATURATION: 89 %

## 2021-01-06 LAB
ALBUMIN SERPL-MCNC: 3.3 GM/DL (ref 3.5–5)
ALP SERPL-CCNC: 71 U/L (ref 50–136)
ALT SERPL-CCNC: 135 U/L (ref 4–49)
ANION GAP SERPL CALC-SCNC: 7 MMOL/L (ref 7–16)
AST SERPL-CCNC: 50 U/L (ref 15–37)
BASOPHILS # BLD: 0 10*3/UL (ref 0–0.2)
BASOPHILS NFR BLD AUTO: 0.3 % (ref 0–2)
BILIRUB SERPL-MCNC: 0.5 MG/DL (ref 0–1)
BUN SERPL-MCNC: 20 MG/DL (ref 9–20)
CALCIUM SERPL-MCNC: 8.8 MG/DL (ref 8.4–10.2)
CHLORIDE SERPL-SCNC: 103 MMOL/L (ref 98–107)
CO2 SERPL-SCNC: 25 MMOL/L (ref 22–30)
CREAT SERPL-SCNC: 0.71 UMOL/L (ref 0.66–1.25)
EOSINOPHIL # BLD: 0.1 10*3/UL (ref 0–0.7)
EOSINOPHIL NFR BLD: 1.2 % (ref 0–4)
ERYTHROCYTE [DISTWIDTH] IN BLOOD BY AUTOMATED COUNT: 13.4 % (ref 11.5–14.5)
GLUCOSE SERPL-MCNC: 103 MG/DL (ref 74–106)
GRANULOCYTES # BLD AUTO: 68.9 % (ref 42.2–75.2)
HCT VFR BLD AUTO: 44.3 % (ref 42–52)
HGB BLD-MCNC: 15.1 G/DL (ref 13.5–18)
LYMPHOCYTES # BLD: 1.7 10*3/UL (ref 1.2–3.4)
LYMPHOCYTES NFR BLD: 17.1 % (ref 20–51)
MAGNESIUM SERPL-MCNC: 2.4 MG/DL (ref 1.6–2.3)
MCH RBC QN AUTO: 29 PG (ref 27–31)
MCHC RBC AUTO-ENTMCNC: 34 G/DL (ref 33–37)
MCV RBC AUTO: 85 FL (ref 80–100)
MONOCYTES # BLD: 0.9 10*3/UL (ref 0.1–0.6)
MONOCYTES NFR BLD AUTO: 8.9 % (ref 1.7–9.3)
NEUTROPHILS # BLD: 6.8 10*3/UL (ref 1.4–6.5)
PLATELET # BLD AUTO: 329 K/MM3 (ref 130–400)
PMV BLD AUTO: 10 FL (ref 7.4–10.4)
POTASSIUM SERPL-SCNC: 4 MMOL/L (ref 3.4–5)
PROT SERPL-MCNC: 6.2 GM/DL (ref 6.4–8.2)
RBC # BLD AUTO: 5.21 M/MM3 (ref 4.2–5.6)
SODIUM SERPL-SCNC: 136 MMOL/L (ref 137–145)

## 2021-01-06 NOTE — NUR
Pt arrived to floor, doing well, resting at side of bed anticipating supper.
Called kitchen to deliver to new room on Medical.  Doing well, will give
report to nightshift nurse who will resume care.

## 2021-01-06 NOTE — NUR
PT stated that he felt his breathing was much better today. No desaturations
or tachypnea noted. Discussed settings on AirVo and BiPAP. Will
continue to monitor.

## 2021-01-06 NOTE — NUR
To room 306 via wheelchair. All belongings taken up to room. Karina RN updated
and in room with patient

## 2021-01-06 NOTE — NUR
Patient assessed at this time. Alert and oriented x 4, and able to make needs
known. Denies pain and discomfort. Peripheral INT to left hand. Denies SOB and
dyspnea at rest. On Airvo at 60L 95%. To wear BIPAP at night, but not ready to
switch over at this time. Told to call when ready, and voiced understanding.
LS coarse crackles in upper lobes, diminished in lower. Respirations even and
unlabored. Productive cough. Given PRN Robitussin as requested. Patient also
given PRN Ativan as requested. HRR. Telemetry in place: normal sinus.
Capillary refill less than 3 seconds. Non-tenting skin turgor. BSAx4. Abdomen
soft and non-tender. No edema. SCDs in place. Voices no questions, needs, or
concerns at this time. Resting in bed with call light within reach.

## 2021-01-07 VITALS — TEMPERATURE: 98.3 F | HEART RATE: 87 BPM | SYSTOLIC BLOOD PRESSURE: 117 MMHG | DIASTOLIC BLOOD PRESSURE: 77 MMHG

## 2021-01-07 VITALS — TEMPERATURE: 98.1 F | DIASTOLIC BLOOD PRESSURE: 71 MMHG | HEART RATE: 94 BPM | SYSTOLIC BLOOD PRESSURE: 115 MMHG

## 2021-01-07 VITALS — SYSTOLIC BLOOD PRESSURE: 102 MMHG | HEART RATE: 95 BPM | DIASTOLIC BLOOD PRESSURE: 80 MMHG | TEMPERATURE: 97.5 F

## 2021-01-07 VITALS — TEMPERATURE: 98.2 F | DIASTOLIC BLOOD PRESSURE: 68 MMHG | HEART RATE: 100 BPM | SYSTOLIC BLOOD PRESSURE: 110 MMHG

## 2021-01-07 VITALS — TEMPERATURE: 97.6 F | DIASTOLIC BLOOD PRESSURE: 80 MMHG | HEART RATE: 88 BPM | SYSTOLIC BLOOD PRESSURE: 128 MMHG

## 2021-01-07 VITALS — SYSTOLIC BLOOD PRESSURE: 105 MMHG | DIASTOLIC BLOOD PRESSURE: 68 MMHG | HEART RATE: 96 BPM | TEMPERATURE: 98.7 F

## 2021-01-07 LAB
ALBUMIN SERPL-MCNC: 3.1 GM/DL (ref 3.5–5)
ALP SERPL-CCNC: 69 U/L (ref 50–136)
ALT SERPL-CCNC: 120 U/L (ref 4–49)
ANION GAP SERPL CALC-SCNC: 7 MMOL/L (ref 7–16)
AST SERPL-CCNC: 58 U/L (ref 15–37)
BILIRUB SERPL-MCNC: 0.4 MG/DL (ref 0–1)
BUN SERPL-MCNC: 21 MG/DL (ref 9–20)
CALCIUM SERPL-MCNC: 8.9 MG/DL (ref 8.4–10.2)
CHLORIDE SERPL-SCNC: 103 MMOL/L (ref 98–107)
CO2 SERPL-SCNC: 28 MMOL/L (ref 22–30)
CREAT SERPL-SCNC: 0.85 UMOL/L (ref 0.66–1.25)
ERYTHROCYTE [DISTWIDTH] IN BLOOD BY AUTOMATED COUNT: 13.4 % (ref 11.5–14.5)
GLUCOSE SERPL-MCNC: 90 MG/DL (ref 74–106)
HCT VFR BLD AUTO: 45.6 % (ref 42–52)
HGB BLD-MCNC: 15.2 G/DL (ref 13.5–18)
LYMPHOCYTES NFR BLD MANUAL: 28 % (ref 20–51)
MAGNESIUM SERPL-MCNC: 2.6 MG/DL (ref 1.6–2.3)
MCH RBC QN AUTO: 28 PG (ref 27–31)
MCHC RBC AUTO-ENTMCNC: 33 G/DL (ref 33–37)
MCV RBC AUTO: 85 FL (ref 80–100)
MONOCYTES NFR BLD: 10 % (ref 1.7–9.3)
NEUTS BAND NFR BLD: 4 % (ref 0–10)
NEUTS SEG NFR BLD MANUAL: 58 % (ref 42–75.2)
PLATELET # BLD AUTO: 351 K/MM3 (ref 130–400)
PLATELET BLD QL SMEAR: NORMAL
PMV BLD AUTO: 10.1 FL (ref 7.4–10.4)
POTASSIUM SERPL-SCNC: 4.2 MMOL/L (ref 3.4–5)
PROT SERPL-MCNC: 5.9 GM/DL (ref 6.4–8.2)
RBC # BLD AUTO: 5.35 M/MM3 (ref 4.2–5.6)
SODIUM SERPL-SCNC: 137 MMOL/L (ref 137–145)

## 2021-01-07 NOTE — NUR
Patient given PRN Ativan and Robitussin as requested for anxiety and cough.
Continues to wear BIPAP at this time. Voices no questions, needs, or concerns
at this time. Resting in bed with call light within reach.

## 2021-01-07 NOTE — NUR
Patient assessed at this time. Alert and oriented x 4, and able to make needs
known. Denies having pain and discomfort at this time. Did complain of cough
and anxiety, and given PRN Robitussin and Ativan as requested. Peripheral INT
to left hand. On Airvo at 60 L 53%. Denies SOB and dyspnea. LS CTA in upper
lobes, diminished in lower. Respirations even and unlabored. HRR. Telemetry in
place: normal sinus. Capillary refill less than 3 seconds. Non-tenting skin
turgor. BSAx4. Abdomen soft and non-tender. No edema. SCDs on. Voices no
questions, needs, or concerns at this time. Resting in bed with call light
within reach.

## 2021-01-07 NOTE — NUR
Pt assessment complete. Pt is sitting up in bed upon entry, wearing the bipap.
Pt states he is ready to take the mask off, placed on Airvo with RT's
assistance. Pt denies any SOB. No Pain reported. Denies N/V/D. SCD's in place.
No needs at this time. Call light within reach.

## 2021-01-07 NOTE — NUR
Patient has not complained of pain or discomfort this shift. Voices no
questions, needs, or concerns at this time. Continues on BIPAP. Resting in bed
with call light within reach.

## 2021-01-08 VITALS — TEMPERATURE: 98 F | SYSTOLIC BLOOD PRESSURE: 110 MMHG | DIASTOLIC BLOOD PRESSURE: 68 MMHG | HEART RATE: 83 BPM

## 2021-01-08 VITALS — HEART RATE: 97 BPM | DIASTOLIC BLOOD PRESSURE: 50 MMHG | TEMPERATURE: 97.7 F | SYSTOLIC BLOOD PRESSURE: 97 MMHG

## 2021-01-08 VITALS — DIASTOLIC BLOOD PRESSURE: 63 MMHG | SYSTOLIC BLOOD PRESSURE: 118 MMHG | HEART RATE: 94 BPM | TEMPERATURE: 98.2 F

## 2021-01-08 VITALS — SYSTOLIC BLOOD PRESSURE: 128 MMHG | HEART RATE: 93 BPM | TEMPERATURE: 98.8 F | DIASTOLIC BLOOD PRESSURE: 74 MMHG

## 2021-01-08 VITALS — TEMPERATURE: 98.9 F | HEART RATE: 96 BPM | SYSTOLIC BLOOD PRESSURE: 108 MMHG | DIASTOLIC BLOOD PRESSURE: 62 MMHG

## 2021-01-08 VITALS — SYSTOLIC BLOOD PRESSURE: 106 MMHG | HEART RATE: 86 BPM | DIASTOLIC BLOOD PRESSURE: 62 MMHG | TEMPERATURE: 97.8 F

## 2021-01-08 LAB
ALBUMIN SERPL-MCNC: 3 GM/DL (ref 3.5–5)
ALP SERPL-CCNC: 64 U/L (ref 50–136)
ALT SERPL-CCNC: 107 U/L (ref 4–49)
ANION GAP SERPL CALC-SCNC: 1 MMOL/L (ref 7–16)
AST SERPL-CCNC: 52 U/L (ref 15–37)
BILIRUB SERPL-MCNC: 0.5 MG/DL (ref 0–1)
BUN SERPL-MCNC: 18 MG/DL (ref 9–20)
CALCIUM SERPL-MCNC: 8.7 MG/DL (ref 8.4–10.2)
CHLORIDE SERPL-SCNC: 101 MMOL/L (ref 98–107)
CO2 SERPL-SCNC: 29 MMOL/L (ref 22–30)
CREAT SERPL-SCNC: 0.72 UMOL/L (ref 0.66–1.25)
ERYTHROCYTE [DISTWIDTH] IN BLOOD BY AUTOMATED COUNT: 13.2 % (ref 11.5–14.5)
GLUCOSE SERPL-MCNC: 80 MG/DL (ref 74–106)
HCT VFR BLD AUTO: 43.8 % (ref 42–52)
HGB BLD-MCNC: 14.6 G/DL (ref 13.5–18)
LYMPHOCYTES NFR BLD MANUAL: 30 % (ref 20–51)
MAGNESIUM SERPL-MCNC: 2.6 MG/DL (ref 1.6–2.3)
MCH RBC QN AUTO: 29 PG (ref 27–31)
MCHC RBC AUTO-ENTMCNC: 33 G/DL (ref 33–37)
MCV RBC AUTO: 86 FL (ref 80–100)
MONOCYTES NFR BLD: 12 % (ref 1.7–9.3)
NEUTS BAND NFR BLD: 1 % (ref 0–10)
NEUTS SEG NFR BLD MANUAL: 57 % (ref 42–75.2)
PLATELET # BLD AUTO: 323 K/MM3 (ref 130–400)
PLATELET BLD QL SMEAR: NORMAL
PMV BLD AUTO: 10.2 FL (ref 7.4–10.4)
POTASSIUM SERPL-SCNC: 3.9 MMOL/L (ref 3.4–5)
PROT SERPL-MCNC: 5.7 GM/DL (ref 6.4–8.2)
RBC # BLD AUTO: 5.11 M/MM3 (ref 4.2–5.6)
SODIUM SERPL-SCNC: 132 MMOL/L (ref 137–145)

## 2021-01-08 NOTE — NUR
Pt assessment completed and charted, medications administered per mar. Pt A&O,
laying in bed, eating breakfast, on airvo 60L 58%. pt denies dizziness, n/v/d,
chest pain, abdominal pain. LH INT IV flushes w/o issues. No edema noted, UL
LS CTA, Bases w/ coarse crackles. HRR. No other concerns expressed at this
time.

## 2021-01-08 NOTE — NUR
Patient assessed at this time. Alert and oriented x 4, and able to make needs
known. Reported headache. Called PA, and new order received for PRN Motrin.
Given as requested. Peripheral INT to left hand. Denies SOB and dyspnea. On
Airvo at 60L 65%. Respirations even and unlabored. Given PRN Robitussin as
requested. LS CTA in upper lobes, diminished in lower. HRR. Telemetry in
place. Capillary refill less than 3 seconds. Non-tenting skin turgor. BSAx4.
Abdomen soft and non-tender. No edema. SCDs on. Given PRN Ativan as requested
for anxiety. Voices no questions, needs, or concerns at this time. Resting in
bed with call light within reach.

## 2021-01-08 NOTE — NUR
Pt doing well today, settings on airvo remained the same. Solid BM, afebrile.
Pt requested robitussin twice today, administered per mar. No further concerns
throughout day.

## 2021-01-08 NOTE — NUR
Patient has denied pain and discomfort. Voices no questions, needs, or
concerns at this time. Resting in bed with call light within reach.

## 2021-01-08 NOTE — NUR
contacted the patient via cell phone to follow up. The patient
states he is feeling a bit better and per EMR he is still on 60L. SW attempted
to contact the patient's wife to follow up and provide support but the call
would not go through.

## 2021-01-09 VITALS — TEMPERATURE: 97.9 F | SYSTOLIC BLOOD PRESSURE: 104 MMHG | HEART RATE: 65 BPM | DIASTOLIC BLOOD PRESSURE: 70 MMHG

## 2021-01-09 VITALS — TEMPERATURE: 97.6 F | SYSTOLIC BLOOD PRESSURE: 110 MMHG | HEART RATE: 73 BPM | DIASTOLIC BLOOD PRESSURE: 74 MMHG

## 2021-01-09 VITALS — HEART RATE: 68 BPM | TEMPERATURE: 97.3 F | SYSTOLIC BLOOD PRESSURE: 102 MMHG | DIASTOLIC BLOOD PRESSURE: 72 MMHG

## 2021-01-09 VITALS — HEART RATE: 99 BPM | DIASTOLIC BLOOD PRESSURE: 62 MMHG | TEMPERATURE: 98.6 F | SYSTOLIC BLOOD PRESSURE: 110 MMHG

## 2021-01-09 VITALS — TEMPERATURE: 98.3 F | SYSTOLIC BLOOD PRESSURE: 112 MMHG | DIASTOLIC BLOOD PRESSURE: 72 MMHG | HEART RATE: 118 BPM

## 2021-01-09 VITALS — HEART RATE: 104 BPM | DIASTOLIC BLOOD PRESSURE: 70 MMHG | TEMPERATURE: 99.1 F | SYSTOLIC BLOOD PRESSURE: 110 MMHG

## 2021-01-09 VITALS — DIASTOLIC BLOOD PRESSURE: 70 MMHG | HEART RATE: 81 BPM | SYSTOLIC BLOOD PRESSURE: 110 MMHG | TEMPERATURE: 97.3 F

## 2021-01-09 LAB
ALBUMIN SERPL-MCNC: 3 GM/DL (ref 3.5–5)
ALP SERPL-CCNC: 69 U/L (ref 50–136)
ALT SERPL-CCNC: 107 U/L (ref 4–49)
ANION GAP SERPL CALC-SCNC: 5 MMOL/L (ref 7–16)
AST SERPL-CCNC: 54 U/L (ref 15–37)
BILIRUB SERPL-MCNC: 0.5 MG/DL (ref 0–1)
BUN SERPL-MCNC: 18 MG/DL (ref 9–20)
CALCIUM SERPL-MCNC: 8.6 MG/DL (ref 8.4–10.2)
CHLORIDE SERPL-SCNC: 103 MMOL/L (ref 98–107)
CO2 SERPL-SCNC: 28 MMOL/L (ref 22–30)
CREAT SERPL-SCNC: 0.71 UMOL/L (ref 0.66–1.25)
ERYTHROCYTE [DISTWIDTH] IN BLOOD BY AUTOMATED COUNT: 13.3 % (ref 11.5–14.5)
GLUCOSE SERPL-MCNC: 90 MG/DL (ref 74–106)
HCT VFR BLD AUTO: 44 % (ref 42–52)
HGB BLD-MCNC: 14.6 G/DL (ref 13.5–18)
LYMPHOCYTES NFR BLD MANUAL: 24 % (ref 20–51)
MAGNESIUM SERPL-MCNC: 2.4 MG/DL (ref 1.6–2.3)
MCH RBC QN AUTO: 29 PG (ref 27–31)
MCHC RBC AUTO-ENTMCNC: 33 G/DL (ref 33–37)
MCV RBC AUTO: 86 FL (ref 80–100)
MONOCYTES NFR BLD: 8 % (ref 1.7–9.3)
MYELOCYTES NFR BLD MANUAL: 2 % (ref 0–0)
NEUTS SEG NFR BLD MANUAL: 66 % (ref 42–75.2)
PLATELET # BLD AUTO: 312 K/MM3 (ref 130–400)
PLATELET BLD QL SMEAR: NORMAL
PMV BLD AUTO: 9.7 FL (ref 7.4–10.4)
POTASSIUM SERPL-SCNC: 4.2 MMOL/L (ref 3.4–5)
PROT SERPL-MCNC: 5.7 GM/DL (ref 6.4–8.2)
RBC # BLD AUTO: 5.09 M/MM3 (ref 4.2–5.6)
SODIUM SERPL-SCNC: 135 MMOL/L (ref 137–145)

## 2021-01-09 NOTE — NUR
Pt assessment complete. Pt is sitting up in bed wearing the bipap. Switched
over to Airvo. He denies any pain. Denies SOB currently. Asking about being
reswabbed for COVID, POC discussed with patient. Encouraged to sit up on the
side of the bed or in the recliner. No further needs at this time. Call light
within reach.

## 2021-01-09 NOTE — NUR
Patient has not had any further complaints of pain or disocmfort since
receiving PRN Motrin at beginning of shift. Voices no questions, needs, or
concerns at this time. Resting in bed with call light within reach. Continues
to wear BIPAP at night, Airvo during the day.

## 2021-01-09 NOTE — NUR
Patient assessed at this time. Alert and orieneted x 4, and able to make needs
known. Denies having pain and discomfort at this time. Peripheral INT to left
hand. Denies SOB and dyspnea. LS CTA in upper lobes, diminished in lower.
Respirations even and unlabored. On Airvo.  HRR. Telemetry in place. Capillary
refill less than 3 seconds. Non-tentig skin turgor. BSAx4. Abdomen soft and
non-tender. No edema. SCDs on. Given PRN Robitussin as requested, as well as
PRN Ativan. Continues on Airborn/contact isolation per protocol. Voices no
questions, needs, or concerns at this time. Resting in bed with call light
within reach.

## 2021-01-10 VITALS — TEMPERATURE: 97.1 F | DIASTOLIC BLOOD PRESSURE: 78 MMHG | SYSTOLIC BLOOD PRESSURE: 112 MMHG | HEART RATE: 73 BPM

## 2021-01-10 VITALS — HEART RATE: 88 BPM | SYSTOLIC BLOOD PRESSURE: 102 MMHG | TEMPERATURE: 99 F | DIASTOLIC BLOOD PRESSURE: 74 MMHG

## 2021-01-10 VITALS — HEART RATE: 107 BPM | TEMPERATURE: 98.9 F | DIASTOLIC BLOOD PRESSURE: 69 MMHG | SYSTOLIC BLOOD PRESSURE: 110 MMHG

## 2021-01-10 VITALS — SYSTOLIC BLOOD PRESSURE: 100 MMHG | DIASTOLIC BLOOD PRESSURE: 60 MMHG | TEMPERATURE: 99 F | HEART RATE: 95 BPM

## 2021-01-10 VITALS — HEART RATE: 100 BPM | TEMPERATURE: 98.8 F | SYSTOLIC BLOOD PRESSURE: 111 MMHG | DIASTOLIC BLOOD PRESSURE: 69 MMHG

## 2021-01-10 LAB
ANION GAP SERPL CALC-SCNC: 4 MMOL/L (ref 7–16)
BUN SERPL-MCNC: 17 MG/DL (ref 9–20)
CALCIUM SERPL-MCNC: 8.4 MG/DL (ref 8.4–10.2)
CHLORIDE SERPL-SCNC: 104 MMOL/L (ref 98–107)
CO2 SERPL-SCNC: 28 MMOL/L (ref 22–30)
CREAT SERPL-SCNC: 0.7 UMOL/L (ref 0.66–1.25)
ERYTHROCYTE [DISTWIDTH] IN BLOOD BY AUTOMATED COUNT: 13.6 % (ref 11.5–14.5)
GLUCOSE SERPL-MCNC: 85 MG/DL (ref 74–106)
HCT VFR BLD AUTO: 42.6 % (ref 42–52)
HGB BLD-MCNC: 14.1 G/DL (ref 13.5–18)
LYMPHOCYTES NFR BLD MANUAL: 26 % (ref 20–51)
MCH RBC QN AUTO: 29 PG (ref 27–31)
MCHC RBC AUTO-ENTMCNC: 33 G/DL (ref 33–37)
MCV RBC AUTO: 88 FL (ref 80–100)
MONOCYTES NFR BLD: 9 % (ref 1.7–9.3)
NEUTS BAND NFR BLD: 3 % (ref 0–10)
NEUTS SEG NFR BLD MANUAL: 62 % (ref 42–75.2)
PLATELET # BLD AUTO: 302 K/MM3 (ref 130–400)
PLATELET BLD QL SMEAR: NORMAL
PMV BLD AUTO: 10 FL (ref 7.4–10.4)
POTASSIUM SERPL-SCNC: 4 MMOL/L (ref 3.4–5)
RBC # BLD AUTO: 4.83 M/MM3 (ref 4.2–5.6)
SODIUM SERPL-SCNC: 136 MMOL/L (ref 137–145)

## 2021-01-10 NOTE — NUR
Pt assessment complete. Pt laying in bed with Airvo on, denies any SOB. Does
report some dizziness. No pain at this time. Denies N/V/D. Requesting bipap to
be put on at 2300. Ativan given per patients request. No other needs at this
time.

## 2021-01-10 NOTE — NUR
PT HAS HAD AN UNEVENTFUL DAY. O2 REQUIREMENTS REMAIN THE SAME. WILL REPORT TO
NIGHT SHIFT RN. NO FURTHER CONCERNS.

## 2021-01-10 NOTE — NUR
Patient has been resting in bed with call light within reach. Has denied
having pain and discomfort this shift. Received PRN Robitussin twice this
shift. Voices no questions, needs, or concerns at this time.

## 2021-01-11 VITALS — TEMPERATURE: 98.2 F | HEART RATE: 102 BPM | SYSTOLIC BLOOD PRESSURE: 107 MMHG | DIASTOLIC BLOOD PRESSURE: 73 MMHG

## 2021-01-11 VITALS — HEART RATE: 118 BPM | TEMPERATURE: 99 F | DIASTOLIC BLOOD PRESSURE: 70 MMHG | SYSTOLIC BLOOD PRESSURE: 122 MMHG

## 2021-01-11 VITALS — HEART RATE: 113 BPM | TEMPERATURE: 98.9 F | SYSTOLIC BLOOD PRESSURE: 121 MMHG | DIASTOLIC BLOOD PRESSURE: 82 MMHG

## 2021-01-11 VITALS — DIASTOLIC BLOOD PRESSURE: 70 MMHG | HEART RATE: 83 BPM | TEMPERATURE: 98 F | SYSTOLIC BLOOD PRESSURE: 98 MMHG

## 2021-01-11 VITALS — HEART RATE: 101 BPM | TEMPERATURE: 98.9 F | SYSTOLIC BLOOD PRESSURE: 102 MMHG | DIASTOLIC BLOOD PRESSURE: 62 MMHG

## 2021-01-11 VITALS — SYSTOLIC BLOOD PRESSURE: 137 MMHG | TEMPERATURE: 98.4 F | HEART RATE: 94 BPM | DIASTOLIC BLOOD PRESSURE: 79 MMHG

## 2021-01-11 LAB
ANION GAP SERPL CALC-SCNC: 5 MMOL/L (ref 7–16)
BUN SERPL-MCNC: 16 MG/DL (ref 9–20)
CALCIUM SERPL-MCNC: 8.4 MG/DL (ref 8.4–10.2)
CHLORIDE SERPL-SCNC: 103 MMOL/L (ref 98–107)
CO2 SERPL-SCNC: 27 MMOL/L (ref 22–30)
CREAT SERPL-SCNC: 0.67 UMOL/L (ref 0.66–1.25)
ERYTHROCYTE [DISTWIDTH] IN BLOOD BY AUTOMATED COUNT: 13.9 % (ref 11.5–14.5)
GLUCOSE SERPL-MCNC: 76 MG/DL (ref 74–106)
HCT VFR BLD AUTO: 42.2 % (ref 42–52)
HGB BLD-MCNC: 13.8 G/DL (ref 13.5–18)
LYMPHOCYTES NFR BLD MANUAL: 21 % (ref 20–51)
MCH RBC QN AUTO: 29 PG (ref 27–31)
MCHC RBC AUTO-ENTMCNC: 33 G/DL (ref 33–37)
MCV RBC AUTO: 88 FL (ref 80–100)
METAMYELOCYTES NFR BLD MANUAL: 2 % (ref 0–0)
MONOCYTES NFR BLD: 10 % (ref 1.7–9.3)
NEUTS SEG NFR BLD MANUAL: 67 % (ref 42–75.2)
PLATELET # BLD AUTO: 251 K/MM3 (ref 130–400)
PLATELET BLD QL SMEAR: NORMAL
PMV BLD AUTO: 10.2 FL (ref 7.4–10.4)
POTASSIUM SERPL-SCNC: 4.1 MMOL/L (ref 3.4–5)
RBC # BLD AUTO: 4.78 M/MM3 (ref 4.2–5.6)
SODIUM SERPL-SCNC: 135 MMOL/L (ref 137–145)

## 2021-01-11 NOTE — NUR
Initial shift assessment done- denies pain- states he is feeling better- on
Airvo,, 45L/53% at this time- sats 92-93%, Up in room as needed- no requests.

## 2021-01-11 NOTE — NUR
Assessmetn charted. Pt doing well, on AIRVO at 50L/50%, denies SOB, able to do
ADLs without difficulty. Feeling hopeful for discharge this week. Has very
good appetite, feels hungry all the time. NO IV since saturday, will address
with hospitalist. SCDs on, aki yin, will cotniue to monitor.

## 2021-01-11 NOTE — NUR
Pt has had a good day. Feels less short of breath, doing well, still has good
appetite and is excited about progress in decreasing oxygen demands. Will chilango
report to nightshift nurse who will resume care.

## 2021-01-12 VITALS — SYSTOLIC BLOOD PRESSURE: 105 MMHG | TEMPERATURE: 98.4 F | DIASTOLIC BLOOD PRESSURE: 71 MMHG | HEART RATE: 88 BPM

## 2021-01-12 VITALS — HEART RATE: 106 BPM | DIASTOLIC BLOOD PRESSURE: 60 MMHG | SYSTOLIC BLOOD PRESSURE: 118 MMHG | TEMPERATURE: 98.5 F

## 2021-01-12 VITALS — SYSTOLIC BLOOD PRESSURE: 116 MMHG | DIASTOLIC BLOOD PRESSURE: 71 MMHG | HEART RATE: 81 BPM | TEMPERATURE: 98.5 F

## 2021-01-12 VITALS — HEART RATE: 72 BPM | TEMPERATURE: 97.6 F | DIASTOLIC BLOOD PRESSURE: 65 MMHG | SYSTOLIC BLOOD PRESSURE: 132 MMHG

## 2021-01-12 VITALS — SYSTOLIC BLOOD PRESSURE: 118 MMHG | HEART RATE: 104 BPM | DIASTOLIC BLOOD PRESSURE: 73 MMHG | TEMPERATURE: 98.8 F

## 2021-01-12 VITALS — TEMPERATURE: 98.6 F | SYSTOLIC BLOOD PRESSURE: 113 MMHG | DIASTOLIC BLOOD PRESSURE: 7 MMHG | HEART RATE: 100 BPM

## 2021-01-12 VITALS — DIASTOLIC BLOOD PRESSURE: 82 MMHG | TEMPERATURE: 97.4 F | HEART RATE: 92 BPM | SYSTOLIC BLOOD PRESSURE: 104 MMHG

## 2021-01-12 LAB
ANION GAP SERPL CALC-SCNC: 7 MMOL/L (ref 7–16)
BUN SERPL-MCNC: 14 MG/DL (ref 9–20)
CALCIUM SERPL-MCNC: 8.8 MG/DL (ref 8.4–10.2)
CHLORIDE SERPL-SCNC: 103 MMOL/L (ref 98–107)
CO2 SERPL-SCNC: 25 MMOL/L (ref 22–30)
CREAT SERPL-SCNC: 0.78 UMOL/L (ref 0.66–1.25)
ERYTHROCYTE [DISTWIDTH] IN BLOOD BY AUTOMATED COUNT: 14 % (ref 11.5–14.5)
GLUCOSE SERPL-MCNC: 137 MG/DL (ref 74–106)
HCT VFR BLD AUTO: 44.7 % (ref 42–52)
HGB BLD-MCNC: 15 G/DL (ref 13.5–18)
LYMPHOCYTES NFR BLD MANUAL: 14 % (ref 20–51)
MCH RBC QN AUTO: 29 PG (ref 27–31)
MCHC RBC AUTO-ENTMCNC: 34 G/DL (ref 33–37)
MCV RBC AUTO: 87 FL (ref 80–100)
MONOCYTES NFR BLD: 9 % (ref 1.7–9.3)
NEUTS SEG NFR BLD MANUAL: 77 % (ref 42–75.2)
PLATELET # BLD AUTO: 247 K/MM3 (ref 130–400)
PLATELET BLD QL SMEAR: NORMAL
PMV BLD AUTO: 10.1 FL (ref 7.4–10.4)
POTASSIUM SERPL-SCNC: 3.8 MMOL/L (ref 3.4–5)
RBC # BLD AUTO: 5.12 M/MM3 (ref 4.2–5.6)
SODIUM SERPL-SCNC: 135 MMOL/L (ref 137–145)

## 2021-01-12 NOTE — NUR
Pt assessment complete. Pt is sitting up in bed upon entry, A/O x4. Her
breathing is even and unlabored on Airvo. He denies any SOB. No pain at this
time. Reported dizziness this am, improved with PO fluids. Asking to take a
shower and change linens today, instructed to let staff know when he would
like to. No further needs. Call light within reach.

## 2021-01-12 NOTE — NUR
Pt tolerated weaning of oxygen well, currently on 2L O2 via NC. O2 sat stable,
pt denies SOB. Did appear to be winded when up for long periods of time.
Reporting pain to L jaw this afternoon, relieved by PRN Motrin. No further
needs at this time. Call light within reach.

## 2021-01-12 NOTE — NUR
Initial shift assessment done- denies pain tonight- no requests- states he is
feeling better- o2 at 2L/nc, sats 95-96%,, Tele on, SCD,s on, Using IS

## 2021-01-13 VITALS — TEMPERATURE: 98.5 F | SYSTOLIC BLOOD PRESSURE: 114 MMHG | DIASTOLIC BLOOD PRESSURE: 72 MMHG | HEART RATE: 102 BPM

## 2021-01-13 VITALS — HEART RATE: 102 BPM | TEMPERATURE: 98.5 F | SYSTOLIC BLOOD PRESSURE: 111 MMHG | DIASTOLIC BLOOD PRESSURE: 64 MMHG

## 2021-01-13 LAB
ALBUMIN SERPL-MCNC: 3.1 GM/DL (ref 3.5–5)
ALP SERPL-CCNC: 90 U/L (ref 50–136)
ALT SERPL-CCNC: 70 U/L (ref 4–49)
ANION GAP SERPL CALC-SCNC: 7 MMOL/L (ref 7–16)
AST SERPL-CCNC: 36 U/L (ref 15–37)
BASOPHILS # BLD: 0 10*3/UL (ref 0–0.2)
BASOPHILS NFR BLD AUTO: 0.2 % (ref 0–2)
BILIRUB DIRECT SERPL-MCNC: 0 MG/DL (ref 0–0.4)
BILIRUB INDIRECT SERPL-MCNC: 0.3 MG/DL (ref 0–1.1)
BILIRUB SERPL-MCNC: 0.3 MG/DL (ref 0–1)
BUN SERPL-MCNC: 14 MG/DL (ref 9–20)
CALCIUM SERPL-MCNC: 8.5 MG/DL (ref 8.4–10.2)
CHLORIDE SERPL-SCNC: 103 MMOL/L (ref 98–107)
CO2 SERPL-SCNC: 25 MMOL/L (ref 22–30)
CREAT SERPL-SCNC: 0.72 UMOL/L (ref 0.66–1.25)
EOSINOPHIL # BLD: 0.1 10*3/UL (ref 0–0.7)
EOSINOPHIL NFR BLD: 1.2 % (ref 0–4)
ERYTHROCYTE [DISTWIDTH] IN BLOOD BY AUTOMATED COUNT: 14.1 % (ref 11.5–14.5)
GLUCOSE SERPL-MCNC: 94 MG/DL (ref 74–106)
GRANULOCYTES # BLD AUTO: 61.2 % (ref 42.2–75.2)
HCT VFR BLD AUTO: 42.7 % (ref 42–52)
HGB BLD-MCNC: 14.3 G/DL (ref 13.5–18)
LYMPHOCYTES # BLD: 2.1 10*3/UL (ref 1.2–3.4)
LYMPHOCYTES NFR BLD: 24.9 % (ref 20–51)
MCH RBC QN AUTO: 30 PG (ref 27–31)
MCHC RBC AUTO-ENTMCNC: 34 G/DL (ref 33–37)
MCV RBC AUTO: 88 FL (ref 80–100)
MONOCYTES # BLD: 0.9 10*3/UL (ref 0.1–0.6)
MONOCYTES NFR BLD AUTO: 11.2 % (ref 1.7–9.3)
NEUTROPHILS # BLD: 5.1 10*3/UL (ref 1.4–6.5)
PLATELET # BLD AUTO: 213 K/MM3 (ref 130–400)
PMV BLD AUTO: 10.4 FL (ref 7.4–10.4)
POTASSIUM SERPL-SCNC: 4.1 MMOL/L (ref 3.4–5)
PROT SERPL-MCNC: 5.9 GM/DL (ref 6.4–8.2)
RBC # BLD AUTO: 4.85 M/MM3 (ref 4.2–5.6)
SODIUM SERPL-SCNC: 135 MMOL/L (ref 137–145)

## 2021-01-13 NOTE — NUR
Pt is currently not on oxygen, pt does desat when ambulating to 89%, otherwise
pt remains at 94% on room air. no other concerns at this time.

## 2021-01-13 NOTE — NUR
The patient has been weaned down to 2L with ambulation. The patient is to
discharge home today, 1/13 with home oxygen. The patient is self-pay. Social
Worker faxed oxygen order to [facility]. The patient's wife to 
the oxygen for the patient. There are no additional needs at this time.

## 2022-08-23 NOTE — NUR
Administer Motrin for temp of 100.1. Patient on phone talking with family.
Denies additional needs. 21-Aug-2022 17:58